# Patient Record
Sex: MALE | Race: ASIAN | NOT HISPANIC OR LATINO | Employment: UNEMPLOYED | URBAN - METROPOLITAN AREA
[De-identification: names, ages, dates, MRNs, and addresses within clinical notes are randomized per-mention and may not be internally consistent; named-entity substitution may affect disease eponyms.]

---
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
## 2021-06-01 VITALS — BODY MASS INDEX: 23.21 KG/M2 | WEIGHT: 131 LBS | HEIGHT: 63 IN

## 2021-06-19 NOTE — PROGRESS NOTES
Physical Exam       ASSESMENT AND PLAN:    Physical, Health Maitenence -   Reviewed healthy lifestyle, diet, exercise, vitamins, and follow-up plan today with patient.  Reviewed age appropriate cancer and other screening recommendations.  Immunization review and update done.  Is up-to-date.  A printed copy of his immunization history was given to him so that he can take this into his college.  Reviewed indicated lab tests, see lab orders.     -     Hearing Screening  -     Vision Screening    Acne  His cost options with the patient.  He was given the number for dermatology consultants, that who he had seen in the past, he will schedule an appointment.        HPI: 18-year-old male here for his physical.  His only other concern is some recurrent acne on the lateral face bilaterally, he has had some scarring in this area, he had been seen dermatology in the past but was lost to follow-up.  He did not think any of the prescribed treatments were helping.      ROS:  No chest pain, no shortness of breath, no blood in the urine, no blood in the stool, Other than the acne no skin lesions that have been changing.  Remainder of review of systems is as above are negative.    No past medical history on file.    Current Outpatient Prescriptions   Medication Sig Dispense Refill     benzoyl peroxide 10 % Crea Apply to affected areas at bedtime 15 g 3     doxycycline (VIBRAMYCIN) 100 MG capsule        sulfacetamide sodium-sulfur 10-5 % (w/w) Clsr        tretinoin (RETIN-A) 0.1 % cream        No current facility-administered medications for this visit.        Patient Active Problem List   Diagnosis     Acne     Hematuria     Hypospadia       Social History     Social History     Marital status: Single     Spouse name: N/A     Number of children: N/A     Years of education: N/A     Social History Main Topics     Smoking status: Passive Smoke Exposure - Never Smoker     Smokeless tobacco: Never Used     Alcohol use No     Drug use: No  "    Sexual activity: Not Currently     Other Topics Concern     None     Social History Narrative       History   Smoking Status     Passive Smoke Exposure - Never Smoker   Smokeless Tobacco     Never Used       OBJECTICE: /68 (Patient Site: Right Arm, Patient Position: Sitting, Cuff Size: Adult Regular)  Pulse 62  Temp 98.4  F (36.9  C) (Oral)   Resp 16  Ht 5' 3.25\" (1.607 m)  Wt 131 lb (59.4 kg)  SpO2 97%  BMI 23.02 kg/m2      Gen - alert, orientated, NAD  Eyes - fundascopic exam limited by the undialated pupil but looks symmetric  ENT - oropharynx clear, TMs clear  Neck - supple, no palpable mass or lymphadenopathy  CV - RRR, no murmur  Resp - lungs CTA  Ab - soft, nontender, no palpable mass or organomegaly   -refused by patient, he has a history of hypospadias but reports no concerns.  Extrem - warm, no edema  Neuro - CN II-XII intact, strength, sensation, reflexes intact and symmetric  Skin -some moderate post acne scarring of the lateral face and some mild/moderate active acne in the same distribution, no atypical appearing lesions seen.     Joe Robins   1:26 PM 8/13/2018                 "

## 2021-07-06 ENCOUNTER — RECORDS - HEALTHEAST (OUTPATIENT)
Dept: GENERAL RADIOLOGY | Facility: CLINIC | Age: 21
End: 2021-07-06

## 2021-07-06 DIAGNOSIS — M25.472 EFFUSION, LEFT ANKLE: ICD-10-CM

## 2021-09-27 ENCOUNTER — IMMUNIZATION (OUTPATIENT)
Dept: FAMILY MEDICINE | Facility: CLINIC | Age: 21
End: 2021-09-27
Payer: COMMERCIAL

## 2021-09-27 PROCEDURE — 90686 IIV4 VACC NO PRSV 0.5 ML IM: CPT

## 2021-09-27 PROCEDURE — 90471 IMMUNIZATION ADMIN: CPT

## 2022-02-17 ENCOUNTER — TELEPHONE (OUTPATIENT)
Dept: INTERNAL MEDICINE | Facility: CLINIC | Age: 22
End: 2022-02-17
Payer: COMMERCIAL

## 2022-02-17 NOTE — TELEPHONE ENCOUNTER
Eh called and stated we cannot do a TB test on a Friday and he should call his primary physician to set up an appointment

## 2022-02-22 ENCOUNTER — OFFICE VISIT (OUTPATIENT)
Dept: FAMILY MEDICINE | Facility: CLINIC | Age: 22
End: 2022-02-22
Payer: COMMERCIAL

## 2022-02-22 VITALS
SYSTOLIC BLOOD PRESSURE: 120 MMHG | OXYGEN SATURATION: 97 % | TEMPERATURE: 98.4 F | HEIGHT: 63 IN | BODY MASS INDEX: 23.88 KG/M2 | RESPIRATION RATE: 20 BRPM | HEART RATE: 68 BPM | DIASTOLIC BLOOD PRESSURE: 80 MMHG | WEIGHT: 134.75 LBS

## 2022-02-22 DIAGNOSIS — Z11.1 SCREENING EXAMINATION FOR PULMONARY TUBERCULOSIS: Primary | ICD-10-CM

## 2022-02-22 DIAGNOSIS — M19.172 POST-TRAUMATIC OSTEOARTHRITIS OF LEFT ANKLE: ICD-10-CM

## 2022-02-22 DIAGNOSIS — Z11.4 SCREENING FOR HIV (HUMAN IMMUNODEFICIENCY VIRUS): ICD-10-CM

## 2022-02-22 DIAGNOSIS — Z11.59 NEED FOR HEPATITIS C SCREENING TEST: ICD-10-CM

## 2022-02-22 DIAGNOSIS — Z23 NEED FOR VACCINATION: ICD-10-CM

## 2022-02-22 LAB
HCV AB SERPL QL IA: NONREACTIVE
HIV 1+2 AB+HIV1 P24 AG SERPL QL IA: NEGATIVE

## 2022-02-22 PROCEDURE — 36415 COLL VENOUS BLD VENIPUNCTURE: CPT | Performed by: FAMILY MEDICINE

## 2022-02-22 PROCEDURE — 86803 HEPATITIS C AB TEST: CPT | Performed by: FAMILY MEDICINE

## 2022-02-22 PROCEDURE — 87389 HIV-1 AG W/HIV-1&-2 AB AG IA: CPT | Performed by: FAMILY MEDICINE

## 2022-02-22 PROCEDURE — 86481 TB AG RESPONSE T-CELL SUSP: CPT | Performed by: FAMILY MEDICINE

## 2022-02-22 PROCEDURE — 99214 OFFICE O/P EST MOD 30 MIN: CPT | Performed by: FAMILY MEDICINE

## 2022-02-22 NOTE — PROGRESS NOTES
ASSESSMENT/PLAN:    Tre was seen today for tb screen.    Diagnoses and all orders for this visit:    Screening examination for pulmonary tuberculosis  Given [place], I recommend QuantiFERON testing to decrease risk of false positive PPD.  This was obtained today and will let him know the results when available.  I have given him information for signing up for my chart and if he does this successfully, he will be able to see the results soonest available.  If I see that he has not signed up for it, we will give him a call and the result is then.  He did not have a fax number anything on hand for ago.  -     Quantiferon TB Gold Plus; Future    Screening for HIV (human immunodeficiency virus)  Screening for HIV done per CDC recommendation  -     HIV Antigen Antibody Combo; Future    Need for hepatitis C screening test  Screening for hepatitis C done per CDC recommendation  -     Hepatitis C Screen Reflex to HCV RNA Quant and Genotype; Future    Post-traumatic osteoarthritis of left ankle  He is having persistent ankle pain 1 year out from ankle injury.  I will have him see orthopedics.  Did not pursue any imaging today as I will defer to them.  -     Orthopedic  Referral; Future    Other orders/Immunization needed  I recommend tetanus booster and Covid booster vaccines to him today.  He is quite overdue for tetanus booster, with last about 12 years ago.  He declines for now, but if entered future orders for this to done at his convenience.  -     REVIEW OF HEALTH MAINTENANCE PROTOCOL ORDERS  -     TDAP VACCINE (Adacel, Boostrix)  [[id number]]; Future          Return in about 6 months (around 8/22/2022) for Routine preventive, with your regular doctor.          SUBJECTIVE:  Tre SPARKS Nicole is a 21 year old male here for TB Screen    Is a 21-year-old patient seen for tuberculosis screening.  He needs this for work.  He did not bring any paperwork in, but says he just needs the report.    He  "has no known contact anyone with strep or Kalosis.  Is not aware of ever being tested for tuberculosis or being treated for it in the past.  He does not have any cough, fever, night sweats, unintentional weight loss, or other illness.  [place]    He also notes today that he is having some left ankle pain since last year.  Records reviewed: Was in for ankle sprain 7/6 6021.  X-ray negative.  Discharged on crutches.    He notes that he could not put any pressure on his ankle at all for 2 to 3 days, but then slowly got better.  Ankle brace seem to make it more uncomfortable so is not using that.  He continues to have some pain with activity that limits his ability to participate in activities.  It is over bilateral malleoli and Achilles tendon.          OBJECTIVE:  :  /80   Pulse 68   Temp 98.4  F (36.9  C) (Oral)   Resp 20   Ht 1.6 m (5' 2.99\")   Wt 61.1 kg (134 lb 12 oz)   SpO2 97%   BMI 23.88 kg/m          Gen:  A&A, NAD  Left lower extremity is unremarkable to visual inspection without swelling, bruising, erythema, gross deformity.  There is no tenderness over the medial malleolus, lateral malleolus, or Achilles tendon at this time.  No pain with passive range of motion of the joint and no joint instability detected.        "

## 2022-02-23 LAB
GAMMA INTERFERON BACKGROUND BLD IA-ACNC: 0.05 IU/ML
M TB IFN-G BLD-IMP: NEGATIVE
M TB IFN-G CD4+ BCKGRND COR BLD-ACNC: 9.95 IU/ML
MITOGEN IGNF BCKGRD COR BLD-ACNC: -0.01 IU/ML
MITOGEN IGNF BCKGRD COR BLD-ACNC: 0 IU/ML
QUANTIFERON MITOGEN: 10 IU/ML
QUANTIFERON NIL TUBE: 0.05 IU/ML
QUANTIFERON TB1 TUBE: 0.05 IU/ML
QUANTIFERON TB2 TUBE: 0.04

## 2022-02-25 ENCOUNTER — TELEPHONE (OUTPATIENT)
Dept: FAMILY MEDICINE | Facility: CLINIC | Age: 22
End: 2022-02-25
Payer: COMMERCIAL

## 2022-02-25 NOTE — LETTER
February 28, 2022       N Nicole  [address] [place] [address]        Dear ,    We are writing to inform you of your test results.    Your tuberculosis test (TB TEST) is negative/normal.     If you have any questions or concerns, please call the clinic at the number listed above.       Sincerely,

## 2022-02-25 NOTE — TELEPHONE ENCOUNTER
Please call pt and let him know that his tuberculosis/TB blood test was negative.  He can either show it to his employer from [employer], or we could print out a copy for him.    (I can see that he hasn't viewed the result on [employer] yet, which is why I'm having you call).

## 2022-02-25 NOTE — TELEPHONE ENCOUNTER
Writer called pt.  No voice mailbox set up. Will have staff make another attempt in the future to relay PCP message.    Maria C CUELLAR RN  [facility]

## 2022-03-22 ENCOUNTER — OFFICE VISIT (OUTPATIENT)
Dept: PODIATRY | Facility: CLINIC | Age: 22
End: 2022-03-22
Payer: COMMERCIAL

## 2022-03-22 VITALS — DIASTOLIC BLOOD PRESSURE: 64 MMHG | SYSTOLIC BLOOD PRESSURE: 118 MMHG | RESPIRATION RATE: 18 BRPM | HEART RATE: 68 BPM

## 2022-03-22 DIAGNOSIS — S93.492A SPRAIN OF ANTERIOR TALOFIBULAR LIGAMENT OF LEFT ANKLE, INITIAL ENCOUNTER: Primary | ICD-10-CM

## 2022-03-22 PROCEDURE — 99203 OFFICE O/P NEW LOW 30 MIN: CPT | Performed by: PODIATRIST

## 2022-03-22 ASSESSMENT — PAIN SCALES - GENERAL: PAINLEVEL: MODERATE PAIN (5)

## 2022-03-22 NOTE — PROGRESS NOTES
FOOT AND ANKLE SURGERY/PODIATRY CONSULT NOTE         ASSESSMENT: Ankle Sprain left       TREATMENT:  -There is mild pain at the ATF left ankle.     -I reviewed the patient's previous x-rays which are negative for fracture. Ankle mortise is intact.    -CAM boot dispensed today. I recommend limited walking.    -I have referred him for an MRI for further evaluation of the lateral ankle ligaments. I will contact the patient with the MRI report when available and we will be guided by the results.     -Patient's questions invited and answered. Patient to return to clinic in 3 week(s) for re-evaluation. He was encouraged to call my office with any further questions or concerns.     Rahat Manjarrez DPM  [facility] Podiatry/Foot & Ankle Surgery      HPI: I was asked to see Tre Rivera today for left ankle pain. The patient states he sustained an injury about one year ago while playing soccer. He has mild pain with walking, and has noticed increased pain when wearing flat soled shoes. He also complains of a bump along his clavicle which he noticed after a soccer injury.       No past medical history on file.      Social History     Socioeconomic History     Marital status: Single     Spouse name: Not on file     Number of children: Not on file     Years of education: Not on file     Highest education level: Not on file   Occupational History     Not on file   Tobacco Use     Smoking status: Passive Smoke Exposure - Never Smoker     Smokeless tobacco: Never Used     Tobacco comment: Mom smoke outside   Substance and Sexual Activity     Alcohol use: No     Drug use: No     Sexual activity: Not Currently   Other Topics Concern     Not on file   Social History Narrative     Not on file     Social Determinants of Health     Financial Resource Strain: Not on file   Food Insecurity: Not on file   Transportation Needs: Not on file   Physical Activity: Not on file   Stress: Not on file   Social Connections: Not on file    Intimate Partner Violence: Not on file   Housing Stability: Not on file          No Known Allergies      MEDICATIONS:   No current outpatient medications on file.     No current facility-administered medications for this visit.        Family History   Problem Relation Age of Onset     Hepatitis Mother         B     No Known Problems Father      Hepatitis Sister         B     No Known Problems Brother           Review of Systems - 10 point Review of Systems is negative except for left ankle pain which is noted in HPI.    OBJECTIVE:  Appearance: alert, well appearing, and in no distress.    VITAL SIGNS: /64   Pulse 68   Resp 18       General appearance: Patient is alert and fully cooperative with history & exam.  No sign of distress is noted during the visit.     Psychiatric: Affect is pleasant & appropriate.  Patient appears motivated to improve health.     Respiratory: Breathing is regular & unlabored while sitting.     HEENT: Hearing is intact to spoken word.  Speech is clear.  No gross evidence of visual impairment that would impact ambulation.      Vascular: Dorsalis pedis and posterior tibial pulses are palpable. There is pedal hair growth left.  CFT < 3 sec from anterior tibial surface to distal digits left. There is no appreciable edema noted.  Dermatologic: Turgor and texture are within normal limits. No coloration or temperature changes. No primary or secondary lesions noted.  Neurologic: All epicritic and proprioceptive sensations are grossly intact left.  Musculoskeletal: Mild pain at ATF left ankle. Full ROM left ankle without crepitus.

## 2022-03-22 NOTE — LETTER
3/22/2022         RE: Tre Rivera  [address] [place] [address]        Dear Colleague,    Thank you for referring your patient, Tre Rivera, to the [facility]. Please see a copy of my visit note below.          FOOT AND ANKLE SURGERY/PODIATRY CONSULT NOTE         ASSESSMENT: Ankle Sprain left       TREATMENT:  -There is mild pain at the ATF left ankle.     -I reviewed the patient's previous x-rays which are negative for fracture. Ankle mortise is intact.    -CAM boot dispensed today. I recommend limited walking.    -I have referred him for an MRI for further evaluation of the lateral ankle ligaments. I will contact the patient with the MRI report when available and we will be guided by the results.     -Patient's questions invited and answered. Patient to return to clinic in 3 week(s) for re-evaluation. He was encouraged to call my office with any further questions or concerns.     Rahat Manjarrez DPM  [facility] Podiatry/Foot & Ankle Surgery      HPI: I was asked to see Tre Rivera today for left ankle pain. The patient states he sustained an injury about one year ago while playing soccer. He has mild pain with walking, and has noticed increased pain when wearing flat soled shoes. He also complains of a bump along his clavicle which he noticed after a soccer injury.       No past medical history on file.      Social History     Socioeconomic History     Marital status: Single     Spouse name: Not on file     Number of children: Not on file     Years of education: Not on file     Highest education level: Not on file   Occupational History     Not on file   Tobacco Use     Smoking status: Passive Smoke Exposure - Never Smoker     Smokeless tobacco: Never Used     Tobacco comment: Mom smoke outside   Substance and Sexual Activity     Alcohol use: No     Drug use: No     Sexual activity: Not Currently   Other Topics Concern     Not on file   Social History Narrative      Not on file     Social Determinants of Health     Financial Resource Strain: Not on file   Food Insecurity: Not on file   Transportation Needs: Not on file   Physical Activity: Not on file   Stress: Not on file   Social Connections: Not on file   Intimate Partner Violence: Not on file   Housing Stability: Not on file          No Known Allergies      MEDICATIONS:   No current outpatient medications on file.     No current facility-administered medications for this visit.        Family History   Problem Relation Age of Onset     Hepatitis Mother         B     No Known Problems Father      Hepatitis Sister         B     No Known Problems Brother           Review of Systems - 10 point Review of Systems is negative except for left ankle pain which is noted in HPI.    OBJECTIVE:  Appearance: alert, well appearing, and in no distress.    VITAL SIGNS: /64   Pulse 68   Resp 18       General appearance: Patient is alert and fully cooperative with history & exam.  No sign of distress is noted during the visit.     Psychiatric: Affect is pleasant & appropriate.  Patient appears motivated to improve health.     Respiratory: Breathing is regular & unlabored while sitting.     HEENT: Hearing is intact to spoken word.  Speech is clear.  No gross evidence of visual impairment that would impact ambulation.      Vascular: Dorsalis pedis and posterior tibial pulses are palpable. There is pedal hair growth left.  CFT < 3 sec from anterior tibial surface to distal digits left. There is no appreciable edema noted.  Dermatologic: Turgor and texture are within normal limits. No coloration or temperature changes. No primary or secondary lesions noted.  Neurologic: All epicritic and proprioceptive sensations are grossly intact left.  Musculoskeletal: Mild pain at ATF left ankle. Full ROM left ankle without crepitus.             Again, thank you for allowing me to participate in the care of your patient.         Sincerely,        Rahat Manjarrez DPM

## 2022-03-22 NOTE — PATIENT INSTRUCTIONS
Please call your primary to evaluate your clavicle injury    Call to schedule your -[phone], we will call you with this report & go from there.

## 2022-04-02 ENCOUNTER — HEALTH MAINTENANCE LETTER (OUTPATIENT)
Age: 22
End: 2022-04-02

## 2022-04-04 ENCOUNTER — TELEPHONE (OUTPATIENT)
Dept: VASCULAR SURGERY | Facility: CLINIC | Age: 22
End: 2022-04-04
Payer: COMMERCIAL

## 2022-04-04 NOTE — TELEPHONE ENCOUNTER
I tried calling the patient to review his MRI report. Patient's voicemail was not setup. I was unable to leave a voicemail.

## 2022-04-05 ENCOUNTER — TELEPHONE (OUTPATIENT)
Dept: VASCULAR SURGERY | Facility: CLINIC | Age: 22
End: 2022-04-05
Payer: COMMERCIAL

## 2022-04-05 NOTE — TELEPHONE ENCOUNTER
I again tried calling the patient to review his MRI report. Patient's voicemail was not setup. I was unable to leave a voicemail.

## 2022-04-12 ENCOUNTER — OFFICE VISIT (OUTPATIENT)
Dept: PODIATRY | Facility: CLINIC | Age: 22
End: 2022-04-12
Payer: COMMERCIAL

## 2022-04-12 VITALS — DIASTOLIC BLOOD PRESSURE: 76 MMHG | HEART RATE: 80 BPM | SYSTOLIC BLOOD PRESSURE: 122 MMHG | TEMPERATURE: 98 F

## 2022-04-12 DIAGNOSIS — M76.72 PERONEAL TENDINITIS OF LEFT LOWER EXTREMITY: ICD-10-CM

## 2022-04-12 DIAGNOSIS — S93.492A SPRAIN OF ANTERIOR TALOFIBULAR LIGAMENT OF LEFT ANKLE, INITIAL ENCOUNTER: Primary | ICD-10-CM

## 2022-04-12 PROCEDURE — 99213 OFFICE O/P EST LOW 20 MIN: CPT | Performed by: PODIATRIST

## 2022-04-12 ASSESSMENT — PAIN SCALES - GENERAL: PAINLEVEL: NO PAIN (0)

## 2022-04-12 NOTE — LETTER
4/12/2022         RE: Tre Rivera  [address] [place] [address]        Dear Colleague,    Thank you for referring your patient, Tre Rivera, to the [facility]. Please see a copy of my visit note below.        FOOT AND ANKLE SURGERY/PODIATRY PROGRESS NOTE        ASSESSMENT:   Ankle Sprain left   Peroneal Tendinitis left       TREATMENT:  -The patient continues to experience pain along the lateral left ankle. There is pain along the ATF and peroneal tendons on exam today.    -I reviewed the patient's MRI of the left ankle today: 1.  No evidence of acute injury.     2.  Normal, intact ankle ligaments.     3.  Mild tenosynovitis in the posterior tibial and extensor digitorum longus tendons.     4.  Tiny ganglion cyst in the sinus tarsi.    -Based on the above, I recommend he begin use of the CAM boot and have referred him to physical therapy.     -Patient's questions invited and answered. Patient to return to clinic after 6-8 PT visits for re-evaluation. He was encouraged to call my office with any further questions or concerns.     Rahat Manjarrez DPM  [facility] Podiatry/Foot & Ankle Surgery      HPI: Tre Rivera was seen again today for a left ankle sprain. The patient states that he has not been using the CAM Boot as directed and has increased walking. He continues to have pain and now states there is discomfort along the lateral left leg.     No past medical history on file.    No past surgical history on file.    No Known Allergies    No current outpatient medications on file.    Family History   Problem Relation Age of Onset     Hepatitis Mother         B     No Known Problems Father      Hepatitis Sister         B     No Known Problems Brother        Social History     Socioeconomic History     Marital status: Single     Spouse name: Not on file     Number of children: Not on file     Years of education: Not on file     Highest education level: Not on file    Occupational History     Not on file   Tobacco Use     Smoking status: Passive Smoke Exposure - Never Smoker     Smokeless tobacco: Never Used     Tobacco comment: Mom smoke outside   Substance and Sexual Activity     Alcohol use: No     Drug use: No     Sexual activity: Not Currently   Other Topics Concern     Not on file   Social History Narrative     Not on file     Social Determinants of Health     Financial Resource Strain: Not on file   Food Insecurity: Not on file   Transportation Needs: Not on file   Physical Activity: Not on file   Stress: Not on file   Social Connections: Not on file   Intimate Partner Violence: Not on file   Housing Stability: Not on file       10 point Review of Systems is negative except for left ankle pain which is noted in HPI.     /76   Pulse 80   Temp 98  F (36.7  C)     BMI= There is no height or weight on file to calculate BMI.    OBJECTIVE:  General appearance: Patient is alert and fully cooperative with history & exam.  No sign of distress is noted during the visit.    Vascular: Dorsalis pedis and posterior tibial pulses are palpable. There is pedal hair growth left.  CFT < 3 sec from anterior tibial surface to distal digits left. There is no appreciable edema noted.  Dermatologic: Turgor and texture are within normal limits. No coloration or temperature changes. No primary or secondary lesions noted.  Neurologic: All epicritic and proprioceptive sensations are grossly intact left.  Musculoskeletal: Mild pain at ATF and along peroneal tendons proximal and distal to lateral malleolus.     Imaging:     MR Ankle Left w/o Contrast    Result Date: 3/30/2022  EXAM DATE:         03/30/2022 EXAM: MRI ANKLE LEFT W/O CONTRAST LOCATION: [facility] DATE/TIME: 3/30/2022 11:45 AM INDICATION: Chronic ankle pain after prior injury. COMPARISON: 7/6/2021. TECHNIQUE: Unenhanced. FINDINGS: TENDONS: -Peroneal: Peroneus longus and brevis tendons are intact.  No tendinopathy or tenosynovitis. No subluxation. -Medial: Very mild tenosynovitis in the posterior tibial tendon. The flexor digitorum longus and flexor hallucis longus tendons are intact and unremarkable. -Anterior: Mild tenosynovitis in the extensor digitorum longus tendon without tearing. The tibialis anterior and extensor hallucis longus tendons intact and unremarkable. -Achilles: No tendinopathy or paratenonitis. LIGAMENTS: -Anterior talofibular ligament: Intact. -Calcaneofibular ligament: Intact. -Posterior talofibular ligament: Intact. -Syndesmotic inferior tibiofibular ligaments: Intact. -Deltoid ligament complex: Intact. -Spring ligament complex: Intact. JOINTS AND BONES: -No fracture, bone contusion or osteochondral lesion. Normal articular cartilage. No joint effusion or synovitis. SOFT TISSUES: -Plantar fascia: Intact. No acute fasciitis or tear. -Sinus tarsi and tarsal tunnel: Small ganglion cyst within the medial portion of the sinus tarsi. The tarsal tunnel structures are intact and unremarkable. -Muscles: Normal. IMPRESSION: 1.  No evidence of acute injury. 2.  Normal, intact ankle ligaments. 3.  Mild tenosynovitis in the posterior tibial and extensor digitorum longus tendons. 4.  Tiny ganglion cyst in the sinus tarsi.            Again, thank you for allowing me to participate in the care of your patient.        Sincerely,        Rahat Manjarrez DPM

## 2022-04-12 NOTE — PROGRESS NOTES
FOOT AND ANKLE SURGERY/PODIATRY PROGRESS NOTE        ASSESSMENT:   Ankle Sprain left   Peroneal Tendinitis left       TREATMENT:  -The patient continues to experience pain along the lateral left ankle. There is pain along the ATF and peroneal tendons on exam today.    -I reviewed the patient's MRI of the left ankle today: 1.  No evidence of acute injury.     2.  Normal, intact ankle ligaments.     3.  Mild tenosynovitis in the posterior tibial and extensor digitorum longus tendons.     4.  Tiny ganglion cyst in the sinus tarsi.    -Based on the above, I recommend he begin use of the CAM boot and have referred him to physical therapy.     -Patient's questions invited and answered. Patient to return to clinic after 6-8 PT visits for re-evaluation. He was encouraged to call my office with any further questions or concerns.     Rahat Manjarrez DPM  [facility] Podiatry/Foot & Ankle Surgery      HPI: Tre Rivera was seen again today for a left ankle sprain. The patient states that he has not been using the CAM Boot as directed and has increased walking. He continues to have pain and now states there is discomfort along the lateral left leg.     No past medical history on file.    No past surgical history on file.    No Known Allergies    No current outpatient medications on file.    Family History   Problem Relation Age of Onset     Hepatitis Mother         B     No Known Problems Father      Hepatitis Sister         B     No Known Problems Brother        Social History     Socioeconomic History     Marital status: Single     Spouse name: Not on file     Number of children: Not on file     Years of education: Not on file     Highest education level: Not on file   Occupational History     Not on file   Tobacco Use     Smoking status: Passive Smoke Exposure - Never Smoker     Smokeless tobacco: Never Used     Tobacco comment: Mom smoke outside   Substance and Sexual Activity     Alcohol use: No     Drug use: No      Sexual activity: Not Currently   Other Topics Concern     Not on file   Social History Narrative     Not on file     Social Determinants of Health     Financial Resource Strain: Not on file   Food Insecurity: Not on file   Transportation Needs: Not on file   Physical Activity: Not on file   Stress: Not on file   Social Connections: Not on file   Intimate Partner Violence: Not on file   Housing Stability: Not on file       10 point Review of Systems is negative except for left ankle pain which is noted in HPI.     /76   Pulse 80   Temp 98  F (36.7  C)     BMI= There is no height or weight on file to calculate BMI.    OBJECTIVE:  General appearance: Patient is alert and fully cooperative with history & exam.  No sign of distress is noted during the visit.    Vascular: Dorsalis pedis and posterior tibial pulses are palpable. There is pedal hair growth left.  CFT < 3 sec from anterior tibial surface to distal digits left. There is no appreciable edema noted.  Dermatologic: Turgor and texture are within normal limits. No coloration or temperature changes. No primary or secondary lesions noted.  Neurologic: All epicritic and proprioceptive sensations are grossly intact left.  Musculoskeletal: Mild pain at ATF and along peroneal tendons proximal and distal to lateral malleolus.     Imaging:     MR Ankle Left w/o Contrast    Result Date: 3/30/2022  EXAM DATE:         03/30/2022 EXAM: MRI ANKLE LEFT W/O CONTRAST LOCATION: [facility] DATE/TIME: 3/30/2022 11:45 AM INDICATION: Chronic ankle pain after prior injury. COMPARISON: 7/6/2021. TECHNIQUE: Unenhanced. FINDINGS: TENDONS: -Peroneal: Peroneus longus and brevis tendons are intact. No tendinopathy or tenosynovitis. No subluxation. -Medial: Very mild tenosynovitis in the posterior tibial tendon. The flexor digitorum longus and flexor hallucis longus tendons are intact and unremarkable. -Anterior: Mild tenosynovitis in the extensor  digitorum longus tendon without tearing. The tibialis anterior and extensor hallucis longus tendons intact and unremarkable. -Achilles: No tendinopathy or paratenonitis. LIGAMENTS: -Anterior talofibular ligament: Intact. -Calcaneofibular ligament: Intact. -Posterior talofibular ligament: Intact. -Syndesmotic inferior tibiofibular ligaments: Intact. -Deltoid ligament complex: Intact. -Spring ligament complex: Intact. JOINTS AND BONES: -No fracture, bone contusion or osteochondral lesion. Normal articular cartilage. No joint effusion or synovitis. SOFT TISSUES: -Plantar fascia: Intact. No acute fasciitis or tear. -Sinus tarsi and tarsal tunnel: Small ganglion cyst within the medial portion of the sinus tarsi. The tarsal tunnel structures are intact and unremarkable. -Muscles: Normal. IMPRESSION: 1.  No evidence of acute injury. 2.  Normal, intact ankle ligaments. 3.  Mild tenosynovitis in the posterior tibial and extensor digitorum longus tendons. 4.  Tiny ganglion cyst in the sinus tarsi.

## 2022-08-12 ENCOUNTER — OFFICE VISIT (OUTPATIENT)
Dept: INTERNAL MEDICINE | Facility: CLINIC | Age: 22
End: 2022-08-12
Payer: COMMERCIAL

## 2022-08-12 VITALS
WEIGHT: 136 LBS | DIASTOLIC BLOOD PRESSURE: 69 MMHG | OXYGEN SATURATION: 99 % | TEMPERATURE: 98.3 F | HEART RATE: 90 BPM | SYSTOLIC BLOOD PRESSURE: 105 MMHG

## 2022-08-12 DIAGNOSIS — M25.572 PAIN IN JOINT INVOLVING ANKLE AND FOOT, LEFT: Primary | ICD-10-CM

## 2022-08-12 PROBLEM — Z11.59 NEED FOR HEPATITIS C SCREENING TEST: Status: ACTIVE | Noted: 2022-08-12

## 2022-08-12 PROCEDURE — 99203 OFFICE O/P NEW LOW 30 MIN: CPT | Performed by: INTERNAL MEDICINE

## 2022-08-12 ASSESSMENT — PATIENT HEALTH QUESTIONNAIRE - PHQ9
10. IF YOU CHECKED OFF ANY PROBLEMS, HOW DIFFICULT HAVE THESE PROBLEMS MADE IT FOR YOU TO DO YOUR WORK, TAKE CARE OF THINGS AT HOME, OR GET ALONG WITH OTHER PEOPLE: NOT DIFFICULT AT ALL
SUM OF ALL RESPONSES TO PHQ QUESTIONS 1-9: 3
SUM OF ALL RESPONSES TO PHQ QUESTIONS 1-9: 3

## 2022-08-12 ASSESSMENT — PAIN SCALES - GENERAL: PAINLEVEL: SEVERE PAIN (6)

## 2022-08-12 NOTE — PROGRESS NOTES
[facility] Follow Up Note    Assessment/Plan:    1. Pain in joint involving ankle and foot, left  Chronic pain loose prolonged walking and weightbearing in his ankle worse in front and below the medial malleoli.  Also notes shinsplints.  MRI was reviewed, no acute fracture or ligamentous tear are noted, he did have mild tenosynovitis back in April.  Currently discussed to proceed with physical therapy and ibuprofen for pain as needed.  If symptoms or not improved, he should see [facility] for further evaluation for possible tarsal tunnel syndrome.  - Physical Therapy Referral; Future      Patient Instructions   1. Take Ibuprofen 400 mg 2-3 times as needed for pain.  2. Have Physical therapy done and follow with their exercises   3. If not better, see orthopedist: [facility].       Tonie Patton MD, MD    Chief Complaint:    Chief Complaint   Patient presents with     RECHECK     For left leg and foot with a bump on ankle.       History of Present Illness:  Eh is a 22 year old male who is healthy who is currently here for follow-up due to persistent pain in his left ankle and shin.    He reports that 2 years ago he sprained his ankle and subsequently developed chronic pain in the front and behind left lateral malleoli.  Pain is constant and worse when he is walking.  He used to play soccer but does not do it anymore due to pain.  He tells me that he have seen specialist for it and had an MRI done.  Initially I could not find any records however it turns out he does have 2 charts.  Podiatry saw him in the spring and ordered an MRI.  MRI did not show any acute injury of a ligamentous tear but some tendinitis.  He was given a boot to wear and reports that it has not helped but made things worse.  He has never had physical therapy done for his ankle pain.    Review of Systems:  A comprehensive review of systems was performed and was otherwise negative    PFSH:  Social History:  Reviewed, he used to work as a   History   Smoking Status     Passive Smoke Exposure - Never Smoker   Smokeless Tobacco     Not on file     Social History     Social History Narrative     Not on file       Past History: Reviewed  No current outpatient medications on file.       Family History:     Physical Exam:    Vitals:    08/12/22 1543   BP: 105/69   BP Location: Left arm   Patient Position: Sitting   Cuff Size: Adult Regular   Pulse: 90   Temp: 98.3  F (36.8  C)   TempSrc: Oral   SpO2: 99%   Weight: 61.7 kg (136 lb)     Wt Readings from Last 3 Encounters:   08/12/22 61.7 kg (136 lb)   01/09/14 48.1 kg (106 lb 0.7 oz) (27 %, Z= -0.61)*     * Growth percentiles are based on CDC (Boys, 2-20 Years) data.     There is no height or weight on file to calculate BMI.    Constitutional:  Reveals a pleasant young male.  Vitals:  Per nursing notes.  HEENT  conjunctiva is pink, no scleral icterus,   Cardiac:  Regular rate and rhythm,no murmurs, rubs, or gallops.  Legs without edema. Palpation of the radial pulse regular.  Lungs: Clear to auscultation bl.  Respiratory effort normal.  Skin:   Without rash, bruise, or palpable lesions.  He does have extensive tattoo on his left calf  Rheumatologic: Normal joints and nails of the hands.  Normal range of motion in his left ankle, no pain with internal or external rotation.  Gait is normal.  No calf tenderness.  When he does experience pain is more in the front of his lower leg/shin  Neurologic:  Cranial nerves II-XII intact.     Psychiatric: affect appropriate, memory intact.       Data Review:    Analysis and Summary of Old Records (2): yes      Records Requested (1): no      Other History Summarized (from other people in the room) (2): no    Radiology Tests Summarized (XRAY/CT/MRI/DXA) (1): mri    Labs Reviewed (1): no    Medicine Tests Reviewed (EKG/ECHO/COLONOSCOPY/EGD) (1): no    Independent Review of EKG or X-RAY (2): no

## 2022-08-12 NOTE — PATIENT INSTRUCTIONS
Take Ibuprofen 400 mg 2-3 times as needed for pain.  Have Physical therapy done and follow with their exercises   If not better, see orthopedist: [facility].

## 2022-09-10 ENCOUNTER — HEALTH MAINTENANCE LETTER (OUTPATIENT)
Age: 22
End: 2022-09-10

## 2023-04-30 ENCOUNTER — HEALTH MAINTENANCE LETTER (OUTPATIENT)
Age: 23
End: 2023-04-30

## 2024-07-07 ENCOUNTER — HEALTH MAINTENANCE LETTER (OUTPATIENT)
Age: 24
End: 2024-07-07

## 2025-07-13 ENCOUNTER — HEALTH MAINTENANCE LETTER (OUTPATIENT)
Age: 25
End: 2025-07-13